# Patient Record
Sex: MALE | Race: WHITE | URBAN - METROPOLITAN AREA
[De-identification: names, ages, dates, MRNs, and addresses within clinical notes are randomized per-mention and may not be internally consistent; named-entity substitution may affect disease eponyms.]

---

## 2022-05-20 ENCOUNTER — VIRTUAL VISIT (OUTPATIENT)
Dept: FAMILY MEDICINE | Facility: CLINIC | Age: 27
End: 2022-05-20

## 2022-05-20 ENCOUNTER — NURSE TRIAGE (OUTPATIENT)
Dept: INTERNAL MEDICINE | Facility: CLINIC | Age: 27
End: 2022-05-20

## 2022-05-20 DIAGNOSIS — F32.1 CURRENT MODERATE EPISODE OF MAJOR DEPRESSIVE DISORDER, UNSPECIFIED WHETHER RECURRENT (H): Primary | ICD-10-CM

## 2022-05-20 DIAGNOSIS — F41.1 GAD (GENERALIZED ANXIETY DISORDER): ICD-10-CM

## 2022-05-20 PROCEDURE — 99203 OFFICE O/P NEW LOW 30 MIN: CPT | Mod: 95 | Performed by: NURSE PRACTITIONER

## 2022-05-20 RX ORDER — HYDROXYZINE HYDROCHLORIDE 25 MG/1
25-50 TABLET, FILM COATED ORAL
Qty: 45 TABLET | Refills: 0 | Status: SHIPPED | OUTPATIENT
Start: 2022-05-20 | End: 2022-05-25

## 2022-05-20 ASSESSMENT — ANXIETY QUESTIONNAIRES
5. BEING SO RESTLESS THAT IT IS HARD TO SIT STILL: NEARLY EVERY DAY
3. WORRYING TOO MUCH ABOUT DIFFERENT THINGS: NEARLY EVERY DAY
1. FEELING NERVOUS, ANXIOUS, OR ON EDGE: NEARLY EVERY DAY
2. NOT BEING ABLE TO STOP OR CONTROL WORRYING: NEARLY EVERY DAY
GAD7 TOTAL SCORE: 21
IF YOU CHECKED OFF ANY PROBLEMS ON THIS QUESTIONNAIRE, HOW DIFFICULT HAVE THESE PROBLEMS MADE IT FOR YOU TO DO YOUR WORK, TAKE CARE OF THINGS AT HOME, OR GET ALONG WITH OTHER PEOPLE: EXTREMELY DIFFICULT
GAD7 TOTAL SCORE: 21
7. FEELING AFRAID AS IF SOMETHING AWFUL MIGHT HAPPEN: NEARLY EVERY DAY
6. BECOMING EASILY ANNOYED OR IRRITABLE: NEARLY EVERY DAY

## 2022-05-20 ASSESSMENT — PATIENT HEALTH QUESTIONNAIRE - PHQ9
SUM OF ALL RESPONSES TO PHQ QUESTIONS 1-9: 25
5. POOR APPETITE OR OVEREATING: NEARLY EVERY DAY

## 2022-05-20 NOTE — TELEPHONE ENCOUNTER
"Patient calling    S-(situation): depression and mood swings    B-(background): patient calling attempting to schedule an appointment to evaluate his depression.  Stated he and his girlfriend are having difficulties.  Has been feeling depressed for quite a while, but has never seeked any help in the past.       A-(assessment): Patient thinks about suicide, but has no intent or plan. Stated \" I can't do that to my kids\"    R-(recommendations): patient wanting an appointment today.  Spoke with scheduling and they were going to try and get him scheduled today, but advised if not able to get in today to go to the ER/UC.  Patient verbalized understanding.    Has appointment today at 2      Reason for Disposition    Sometimes has thoughts of suicide    Additional Information    Negative: Patient attempted suicide    Negative: Patient is threatening suicide now    Negative: Violent behavior, or threatening to physically hurt or kill someone    Negative: Patient is very confused (disoriented, slurred speech) and no other adult (e.g., friend or family member) available    Negative: Difficult to awaken or acting very confused (disoriented, slurred speech) and new onset    Negative: Sounds like a life-threatening emergency to the triager    Negative: Depression and unable to do any of normal activities (e.g., self care, school, work; in comparison to baseline).    Negative: Very strange or confused behavior    Negative: Patient sounds very sick or weak to the triager    Answer Assessment - Initial Assessment Questions  1. CONCERN: \"What happened that made you call today?\"      Depression and mood swings  2. DEPRESSION SYMPTOM SCREENING: \"How are you feeling overall?\" (e.g., decreased energy, increased sleeping or difficulty sleeping, difficulty concentrating, feelings of sadness, guilt, hopelessness, or worthlessness)      Sadness, hopelessness  3. RISK OF HARM - SUICIDAL IDEATION:  \"Do you ever have thoughts of hurting or " "killing yourself?\"  (e.g., yes, no, no but preoccupation with thoughts about death)    - INTENT:  \"Do you have thoughts of hurting or killing yourself right NOW?\" (e.g., yes, no, N/A)    - PLAN: \"Do you have a specific plan for how you would do this?\" (e.g., gun, knife, overdose, no plan, N/A)      Yes, no intent or plan  4. RISK OF HARM - HOMICIDAL IDEATION:  \"Do you ever have thoughts of hurting or killing someone else?\"  (e.g., yes, no, no but preoccupation with thoughts about death)    - INTENT:  \"Do you have thoughts of hurting or killing someone right NOW?\" (e.g., yes, no, N/A)    - PLAN: \"Do you have a specific plan for how you would do this?\" (e.g., gun, knife, no plan, N/A)       no  5. FUNCTIONAL IMPAIRMENT: \"How have things been going for you overall in your life? Have you had any more difficulties than usual doing your normal daily activities?\"  (e.g., better, same, worse; self-care, school, work, interactions)      Stated he and his girl friend are having problems  6. SUPPORT: \"Who is with you now?\" \"Who do you live with?\" \"Do you have family or friends nearby who you can talk to?\"       Yes, family  7. THERAPIST: \"Do you have a counselor or therapist? Name?\"      no  8. STRESSORS: \"Has there been any new stress or recent changes in your life?\"      Yes, problems with girlfriend  9. DRUG ABUSE/ALCOHOL: \"Do you drink alcohol or use any illegal drugs?\"       no  10. OTHER: \"Do you have any other health or medical symptoms right now?\" (e.g., fever)        no  11. PREGNANCY: \"Is there any chance you are pregnant?\" \"When was your last menstrual period?\"        n/a    Protocols used: DEPRESSION-A-OH      "

## 2022-05-20 NOTE — COMMUNITY RESOURCES LIST (ENGLISH)
05/20/2022   [facility] - Outpatient Clinics  Nadira Ledezma  For questions about this resource list or additional care needs, please contact your primary care clinic or care manager.  Phone: [phone]   Email: N/A   Address: [address] [place] [address]   Hours: N/A        Hotlines and Helplines       Hotline - Crisis help  1  [facility] - Adult Protection - 24-Hour Crisis Response Distance: 4.7 miles      COVID-19 Status: Phone/Virtual   [address] [place] [address]  Language: English  Hours: Mon - Sun Open 24 Hours   Phone: (872) 152-8337 Email: social.services@[facility]. Website: https://www.[facility]/HealthFamily/AdultProtection/Pages/default.aspx     2  [facility] Crisis Response Unit - Suicide and Crisis Response Hotline Distance: 4.73 miles      COVID-19 Status: Phone/Virtual   [address] [address]  Language: English  Hours: Mon - Sun Open 24 Hours   Phone: (574) 660-6438 Email: carol@[facility] Website: https://www.[facility]/HealthFamily/HandlingEmergencies/Help          Mental Health       Individual counseling  3  [facility] for Personal & Family Development - [facility] Distance: 3.02 miles      COVID-19 Status: Service Unavailable   [address] [place] [address]  Language: English  Hours: Mon - Fri 8:00 AM - 5:00 PM Appt. Only  Fees: Insurance, Self Pay   Phone: (534) 444-8610 Email: info@[email or web address].com Website: http://www.mentalhealthSandbox.com/[facility]/     4  [facility] Distance: 3.47 miles      COVID-19 Status: Regular Operations, COVID-19 Status: Phone/Virtual   [address] [place] [address]  Language: English  Hours: Mon - Thu 7:00 AM - 8:00 PM , Fri 7:00 AM - 5:00 PM  Fees: Insurance, Self Pay   Phone: (550) 172-3990 Email: [email or web address] Website:  https://www.[employer]/our-locations/[place]/[facility]/     Mental health crisis care  5  [facility] - Crisis Stabilization Services ([facility]) Distance: 2.56 miles      COVID-19 Status: Regular Operations   [address] [place] [address]  Language: English, Prydeinig  Hours: Mon - Sun Open 24 Hours  Fees: Insurance   Phone: (542) 765-3925 Email: info@[employer] Website: https://[employer].LatinComics/services-programs/residential-services/[facility]/     6  [facility] - 24-Hour Mental Health Practitioner Distance: 6.49 miles      COVID-19 Status: Regular Operations, COVID-19 Status: Phone/Virtual   [address] [facility] [address] [place] [address]  Language: English, Hmong  Hours: Mon - Fri 8:00 AM - 4:30 PM  Fees: Insurance, Self Pay   Phone: (804) 374-2558 Email: info@[employer] Website: http://www.[employer]/     Mental health support group  7  [facility] Distance: 3.47 miles      COVID-19 Status: Phone/Virtual   [address] [facility] [address] [place] [address]  Language: English  Hours: Mon - Thu 7:00 AM - 8:00 PM , Fri 7:00 AM - 5:00 PM  Fees: Insurance, Self Pay   Phone: (125) 966-7894 Email: byron@[employer] Website: https://www.[employer]/our-locations/[place]/[facility]/     8  [facility] Distance: 7.03 miles      COVID-19 Status: Phone/Virtual   [address] [facility] [address] [place] [address]  Language: English  Hours: Mon - Fri 8:00 AM - 4:30 PM  Fees: Free   Phone: (605) 262-3622 Website: https://www.va.gov/find-locations/facility/vc_0416V          Important Numbers & Websites       Emergency Services   911  City Services   311  Poison Control   (326) 756-8739  Suicide Prevention Lifeline   (975) 766-8231 (TALK)  Child Abuse Hotline   (349) 829-1897 (4-A-Child)  Sexual Assault Hotline   (300) 446-9683 (HOPE)  [facility]  Safeline   (258) 570-8622 (RUNAWAY)  All-Options Talkline   (649) 923-7209  Substance Abuse Referral   (103) 164-9984 (HELP)

## 2022-05-20 NOTE — PROGRESS NOTES
"Sudeep is a 26 year old who is being evaluated via a billable video visit.      How would you like to obtain your AVS? MyChart  If the video visit is dropped, the invitation should be resent by: Text to cell phone: [phone]  Will anyone else be joining your video visit? No    Telephone Start Time: 3:12 p.m     Assessment & Plan     Diagnoses and all orders for this visit:    Current moderate episode of major depressive disorder, unspecified whether recurrent (H)  HUGO (generalized anxiety disorder)  PHQ 5/20/2022   PHQ-9 Total Score 25   Q9: Thoughts of better off dead/self-harm past 2 weeks Several days   Thoughts of \"wishing like he wasn't here\" but knows that he wouldn't act on anything.  No current suicidal thoughts.   No suicidal intent or plan.    HUGO-7 SCORE 5/20/2022   Total Score 21     Patient education completed regarding importance of initiating therapy and starting selective serotonin reuptake inhibitor - Sertraline.   Trial of hydroxyzine at bedtime for anxiety/sleep.    Close follow-up in 2-4 weeks, sooner as needed.      -     sertraline (ZOLOFT) 50 MG tablet; Take 1/2 tablet (25 mg) by mouth once daily for 7 days and then increase to 1 tablet (50 mg) by mouth once daily  -     Adult Mental Health  Referral; Future  -     hydrOXYzine (ATARAX) 25 MG tablet; Take 1-2 tablets (25-50 mg) by mouth nightly as needed for anxiety (insomnia)        Return in about 4 weeks (around 6/17/2022) for Med check, Telephone Visit.    Rupali Rocha, LAMONTE [facility]      Sudeep is a 26 year old who presents for the following health issues:    HPI     Abnormal Mood symptoms    Patient reports that mental health concerns have been ongoing since childhood.  Anxiety is through the roof and depression is significant, can't really eat.   Worsening of symptoms one month ago.      Is currently looking for work, had a job interview today.  Currently employed at " "[employer].    Lives with parents.    Relationship difficulty, has been dating girlfriend for the past 6 years.  Girlfriend moved to her mom's and stated that he needed to work on himself.     Thoughts of \"wishing like he wasn't here\" but knows that he wouldn't act on anything.  No current suicidal thoughts.   No suicidal intent or plan.    Doesn't like to leave his room due to yelling in his home.  Parents aren't supportive.      2 children - 5 years old and 4 month old.      Is taking 1-2 Melatonin at night and this isn't helpful (no more than 10 mg). +Ruminating thoughts.    No primary provider.      No previous therapy.      Onset/Duration: has always dealt with anxiety - has never been seen for it  Description: Anxiety is through the roof  Depression (if yes, do PHQ-9): YES  Anxiety (if yes, do HUGO-7): YES- the other day was going through some stuff wasn't able to move fingers, felt numb in face, possibly a panic attack  - was crying on the bathroom floor, this lasted for 10 minutes and then resolved.    Accompanying Signs & Symptoms:  Still participating in activities that you used to enjoy: no  Fatigue: YES  Irritability: YES  Difficulty concentrating: YES  Changes in appetite: YES- eating less  Problems with sleep: YES  Heart racing/beating fast: YES- sometimes  Abnormally elevated, expansive, or irritable mood: YES  Persistently increased activity or energy: no  Thoughts of hurting yourself or others: YES- thoughts in the past  History:  Recent stress or major life event: YES- girlfriend moved out with her mom, because she told pt he needs to work on himself  Prior depression or anxiety: yes states he's always had it  Family history of depression or anxiety: Not sure  Alcohol/drug use: Quit drinking, uses marijuana more CBD  Difficulty sleeping: no  Precipitating or alleviating factors: None  Therapies tried and outcome: none    PHQ 5/20/2022   PHQ-9 Total Score 25   Q9: Thoughts of better off dead/self-harm " past 2 weeks Several days     HUGO-7 SCORE 5/20/2022   Total Score 21     Review of Systems   Constitutional, HEENT, cardiovascular, pulmonary, gi and gu systems are negative, except as otherwise noted.      Objective           Vitals:  No vitals were obtained today due to virtual visit.    Physical Exam     GENERAL: Healthy, alert and no distress  RESP: No audible wheeze, cough, or visible cyanosis.  No visible retractions or increased work of breathing.    SKIN: Visible skin clear. No significant rash, abnormal pigmentation or lesions.  NEURO: Cranial nerves grossly intact.  Mentation and speech appropriate for age.  PSYCH: Mentation appears normal, affect normal/bright, judgement and insight intact, normal speech and appearance well-groomed.        Visit Details    Type of service:  Video Visit    Telephone End Time:  3:28 p.m.     Originating Location (pt. Location): Home    Distant Location (provider location):  [facility]     Platform used for Video Visit: Unable to complete video visit

## 2022-05-21 ENCOUNTER — MYC MEDICAL ADVICE (OUTPATIENT)
Dept: FAMILY MEDICINE | Facility: CLINIC | Age: 27
End: 2022-05-21

## 2022-05-21 DIAGNOSIS — F41.1 GAD (GENERALIZED ANXIETY DISORDER): ICD-10-CM

## 2022-05-23 NOTE — TELEPHONE ENCOUNTER
Please see my chart message below     Please review and advise     Thank you     Kimberlee Leslie RN, BSN  [facility]

## 2022-05-23 NOTE — TELEPHONE ENCOUNTER
My chart message sent     Awaiting reply     Kimberlee Leslie RN, BSN  [facility]

## 2022-05-24 NOTE — TELEPHONE ENCOUNTER
Recommend increasing hydroxyzine to 75 mg (three 25 mg tablets) at bedtime (take 1 hour prior to anticipated bedtime).  Close follow-up - schedule appt in 2 weeks for recheck.        - Last, CNP

## 2022-05-25 RX ORDER — HYDROXYZINE HYDROCHLORIDE 25 MG/1
75 TABLET, FILM COATED ORAL
Qty: 45 TABLET | Refills: 0 | COMMUNITY
Start: 2022-05-25 | End: 2022-06-21

## 2022-05-25 NOTE — TELEPHONE ENCOUNTER
My chart message sent     Med list updated     Kimberlee Leslie RN, BSN  [facility]

## 2022-05-29 ENCOUNTER — HEALTH MAINTENANCE LETTER (OUTPATIENT)
Age: 27
End: 2022-05-29

## 2022-06-21 ENCOUNTER — VIRTUAL VISIT (OUTPATIENT)
Dept: FAMILY MEDICINE | Facility: CLINIC | Age: 27
End: 2022-06-21

## 2022-06-21 DIAGNOSIS — F32.1 CURRENT MODERATE EPISODE OF MAJOR DEPRESSIVE DISORDER, UNSPECIFIED WHETHER RECURRENT (H): ICD-10-CM

## 2022-06-21 DIAGNOSIS — F41.1 GAD (GENERALIZED ANXIETY DISORDER): ICD-10-CM

## 2022-06-21 DIAGNOSIS — R41.840 CONCENTRATION DEFICIT: Primary | ICD-10-CM

## 2022-06-21 PROCEDURE — 99214 OFFICE O/P EST MOD 30 MIN: CPT | Mod: 95 | Performed by: NURSE PRACTITIONER

## 2022-06-21 PROCEDURE — 96127 BRIEF EMOTIONAL/BEHAV ASSMT: CPT | Performed by: NURSE PRACTITIONER

## 2022-06-21 RX ORDER — SERTRALINE HYDROCHLORIDE 100 MG/1
100 TABLET, FILM COATED ORAL DAILY
Qty: 30 TABLET | Refills: 2 | Status: SHIPPED | OUTPATIENT
Start: 2022-06-21

## 2022-06-21 ASSESSMENT — ANXIETY QUESTIONNAIRES
2. NOT BEING ABLE TO STOP OR CONTROL WORRYING: MORE THAN HALF THE DAYS
5. BEING SO RESTLESS THAT IT IS HARD TO SIT STILL: MORE THAN HALF THE DAYS
6. BECOMING EASILY ANNOYED OR IRRITABLE: MORE THAN HALF THE DAYS
1. FEELING NERVOUS, ANXIOUS, OR ON EDGE: MORE THAN HALF THE DAYS
GAD7 TOTAL SCORE: 12
7. FEELING AFRAID AS IF SOMETHING AWFUL MIGHT HAPPEN: SEVERAL DAYS
3. WORRYING TOO MUCH ABOUT DIFFERENT THINGS: SEVERAL DAYS
IF YOU CHECKED OFF ANY PROBLEMS ON THIS QUESTIONNAIRE, HOW DIFFICULT HAVE THESE PROBLEMS MADE IT FOR YOU TO DO YOUR WORK, TAKE CARE OF THINGS AT HOME, OR GET ALONG WITH OTHER PEOPLE: SOMEWHAT DIFFICULT
GAD7 TOTAL SCORE: 12

## 2022-06-21 ASSESSMENT — PATIENT HEALTH QUESTIONNAIRE - PHQ9
SUM OF ALL RESPONSES TO PHQ QUESTIONS 1-9: 16
5. POOR APPETITE OR OVEREATING: MORE THAN HALF THE DAYS

## 2022-06-21 NOTE — COMMUNITY RESOURCES LIST (ENGLISH)
06/21/2022   [facility] - Outpatient Clinics  Scarlet Borrego  For questions about this resource list or additional care needs, please contact your primary care clinic or care manager.  Phone: [phone]   Email: N/A   Address: [address] [place] [address]   Hours: N/A        Hotlines and Helplines       Hotline - Crisis help  1  [facility] - Adult Protection - 24-Hour Crisis Response Distance: 4.7 miles      COVID-19 Status: Phone/Virtual   [address] [place] [address]  Language: English  Hours: Mon - Sun Open 24 Hours   Phone: (537) 246-2377 Email: DisabledPark.services@[facility]. Website: https://www.[facility]/HealthFamily/AdultProtection/Pages/default.aspx     2  [facility] Crisis Response Unit - Suicide and Crisis Response Hotline Distance: 4.73 miles      COVID-19 Status: Phone/Virtual   [address] [address]  Language: English  Hours: Mon - Sun Open 24 Hours   Phone: (748) 548-8320 Email: carol@[facility] Website: https://www.[facility]/HealthFamily/HandlingEmergencies/Help          Mental Health       Individual counseling  3  [facility] Personal & Family Development [facility] Distance: 3.02 miles      COVID-19 Status: Service Unavailable   [address] [place] [address]  Language: English  Hours: Mon - Fri 8:00 AM - 5:00 PM Appt. Only  Fees: Insurance, Self Pay   Phone: (142) 711-2598 Email: info@[email or web address] Website: http://www.mentalhealthinc.com/[facility]/     4  [facility] Distance: 3.47 miles      COVID-19 Status: Regular Operations, COVID-19 Status: Phone/Virtual   [address] [place] [address]  Language: English  Hours: Mon - Thu 7:00 AM - 8:00 PM , Fri 7:00 AM - 5:00 PM  Fees: Insurance, Self Pay   Phone: (482) 610-6326 Email: contactus@[employer] Website:  https://www.[employer]/our-locations/[place]/[facility]/     Mental health crisis care  5  [employer] - Crisis Stabilization Services ([facility]) Distance: 2.56 miles      COVID-19 Status: Regular Operations   [address] [place] [address]  Language: English, Prydeinig  Hours: Mon - Sun Open 24 Hours  Fees: Insurance   Phone: (804) 448-7398 Email: info@[employer] Website: https://[employer]/services-programs/residential-services/[facility]/     6  [facility] - 24-Hour Mental Health Practitioner Distance: 6.49 miles      COVID-19 Status: Regular Operations, COVID-19 Status: Phone/Virtual   [address] [facility] [address] [place] [address]  Language: English, Hmong  Hours: Mon - Fri 8:00 AM - 4:30 PM  Fees: Insurance, Self Pay   Phone: (182) 584-4728 Email: info@[employer] Website: http://www.[employer]/     Mental health support group  7  [facility] Distance: 3.47 miles      COVID-19 Status: Phone/Virtual   [address] [facility] [address] [place] [address]  Language: English  Hours: Mon - Thu 7:00 AM - 8:00 PM , Fri 7:00 AM - 5:00 PM  Fees: Insurance, Self Pay   Phone: (852) 514-5648 Email: byron@[employer] Website: https://www.[employer]/our-locations/[place]/[facility]/     8  [facility] Distance: 7.03 miles      COVID-19 Status: Phone/Virtual   [address] [facility] [address] [place] [address]  Language: English  Hours: Mon - Fri 8:00 AM - 4:30 PM  Fees: Free   Phone: (804) 716-4453 Website: https://www.va.gov/find-locations/facility/vc_0416V          Important Numbers & Websites       Emergency Services   911  City Services   311  Poison Control   (310) 230-6101  Suicide Prevention Lifeline   (743) 197-9153 (TALK)  Child Abuse Hotline   (909) 998-1015 (4-A-Child)  Sexual Assault Hotline   (744) 396-9261 (HOPE)  [facility]  Safeline   (974) 332-5498 (RUNAWAY)  All-Options Talkline   (744) 226-9133  Substance Abuse Referral   (584) 901-2514 (HELP)

## 2022-06-21 NOTE — PROGRESS NOTES
"Sudeep is a 26 year old who is being evaluated via a billable telephone visit.      What phone number would you like to be contacted at? [phone]  How would you like to obtain your AVS? Cricket    Assessment & Plan     Sudeep was seen today for recheck medication.    Diagnoses and all orders for this visit:    Concentration deficit  Patient reported childhood history of ADHD.    Recommended further testing/evaluation for ADHD.    -     Adult Mental Health  Referral; Future    Current moderate episode of major depressive disorder, unspecified whether recurrent (H)  HUGO (generalized anxiety disorder)  PHQ 5/20/2022 6/21/2022   PHQ-9 Total Score 25 16   Q9: Thoughts of better off dead/self-harm past 2 weeks Several days Several days     HUGO-7 SCORE 5/20/2022 6/21/2022   Total Score 21 12   Tolerating medication well - plan increase in selective serotonin reuptake inhibitor - Sertraline to 100 mg daily.   Encouraged scheduling therapy appointment.    -     sertraline (ZOLOFT) 100 MG tablet; Take 1 tablet (100 mg) by mouth daily      Return in about 3 weeks (around 7/12/2022) for Med check, Telephone Visit.      Rupali Rocha, APRSTEVE [facility] [place] [facility] [place]          Subjective   Sudeep is a 26 year old, presenting for the following health issues:  Recheck Medication      HPI     Depression and Anxiety Follow-Up    Patient reports things as going \"ok\".  He states that he doesn't know if the medication is working or not.    Missed only one day of medication.  Girlfriend has been helping him remember to take the medication.      Tried Hydroxyzine 2-4 tablets at bedtime without success.   Is taking Sertraline at bedtime - fatigue at first, but then got used to the medication.     Sleep hasn't been the best.  Has to get up at 1:30 a.m. to go to work and then typically goes to sleep around 10-10:30 p.m. +Anxiety is significant prior to bedtime.      Diagnosis of ADHD at a young age.  No " medication use for ADHD as a child.  He reports that his mother didn't believe in medication for ADHD.      Called to set up therapy appointment.        How are you doing with your depression since your last visit? No change    How are you doing with your anxiety since your last visit?  Improved     Are you having other symptoms that might be associated with depression or anxiety? No    Have you had a significant life event? No     Do you have any concerns with your use of alcohol or other drugs? No       PHQ 5/20/2022 6/21/2022   PHQ-9 Total Score 25 16   Q9: Thoughts of better off dead/self-harm past 2 weeks Several days Several days     HUGO-7 SCORE 5/20/2022 6/21/2022   Total Score 21 12     Last PHQ-9 6/21/2022   1.  Little interest or pleasure in doing things 2   2.  Feeling down, depressed, or hopeless 1   3.  Trouble falling or staying asleep, or sleeping too much 3   4.  Feeling tired or having little energy 2   5.  Poor appetite or overeating 2   6.  Feeling bad about yourself 2   7.  Trouble concentrating 2   8.  Moving slowly or restless 1   Q9: Thoughts of better off dead/self-harm past 2 weeks 1   PHQ-9 Total Score 16   Difficulty at work, home, or with people Somewhat difficult     HUGO-7  6/21/2022   1. Feeling nervous, anxious, or on edge 2   2. Not being able to stop or control worrying 2   3. Worrying too much about different things 1   4. Trouble relaxing 2   5. Being so restless that it is hard to sit still 2   6. Becoming easily annoyed or irritable 2   7. Feeling afraid, as if something awful might happen 1   HUGO-7 Total Score 12   If you checked any problems, how difficult have they made it for you to do your work, take care of things at home, or get along with other people? Somewhat difficult           How many servings of fruits and vegetables do you eat daily?  0-1    On average, how many sweetened beverages do you drink each day (Examples: soda, juice, sweet tea, etc.  Do NOT count diet or  artificially sweetened beverages)?   1    How many days per week do you exercise enough to make your heart beat faster? 7    How many minutes a day do you exercise enough to make your heart beat faster? 30 - 60    How many days per week do you miss taking your medication? 0      Review of Systems   Constitutional, HEENT, cardiovascular, pulmonary, gi and gu systems are negative, except as otherwise noted.      Objective           Vitals:  No vitals were obtained today due to virtual visit.    Physical Exam     General:  healthy, alert and no distress  PSYCH: Alert and oriented times 3; coherent speech, normal   rate and volume, able to articulate logical thoughts, able   to abstract reason, no tangential thoughts, no hallucinations   or delusions  His affect is normal  RESP: No cough, no audible wheezing, able to talk in full sentences  Remainder of exam unable to be completed due to telephone visits      Phone call duration:  14 minutes  5:18 p.m. to 5:32 p.m.   .  ..

## 2022-10-03 ENCOUNTER — HEALTH MAINTENANCE LETTER (OUTPATIENT)
Age: 27
End: 2022-10-03

## 2022-10-07 ENCOUNTER — VIRTUAL VISIT (OUTPATIENT)
Dept: FAMILY MEDICINE | Facility: CLINIC | Age: 27
End: 2022-10-07

## 2022-10-07 DIAGNOSIS — Z91.199 NO-SHOW FOR APPOINTMENT: Primary | ICD-10-CM

## 2022-10-07 RX ORDER — SERTRALINE HYDROCHLORIDE 100 MG/1
100 TABLET, FILM COATED ORAL DAILY
Qty: 30 TABLET | Refills: 2 | Status: CANCELLED | OUTPATIENT
Start: 2022-10-07

## 2023-05-01 ENCOUNTER — TELEPHONE (OUTPATIENT)
Dept: FAMILY MEDICINE | Facility: CLINIC | Age: 28
End: 2023-05-01

## 2023-05-01 NOTE — TELEPHONE ENCOUNTER
Needs of attention regarding:  -Depression    Health Maintenance Topics with due status: Overdue       Topic Date Due    YEARLY PREVENTIVE VISIT Never done    ANNUAL REVIEW OF HM ORDERS Never done    ADVANCE CARE PLANNING Never done    DEPRESSION ACTION PLAN Never done    COVID-19 Vaccine Never done    HPV IMMUNIZATION Never done    HIV SCREENING Never done    HEPATITIS C SCREENING Never done    DTAP/TDAP/TD IMMUNIZATION 04/17/2018    INFLUENZA VACCINE 09/01/2022    PHQ-9 12/21/2022       Communication:  See MyChart message

## 2023-06-04 ENCOUNTER — HEALTH MAINTENANCE LETTER (OUTPATIENT)
Age: 28
End: 2023-06-04

## 2024-07-28 ENCOUNTER — HEALTH MAINTENANCE LETTER (OUTPATIENT)
Age: 29
End: 2024-07-28

## 2025-08-10 ENCOUNTER — HEALTH MAINTENANCE LETTER (OUTPATIENT)
Age: 30
End: 2025-08-10